# Patient Record
Sex: FEMALE | Race: WHITE | NOT HISPANIC OR LATINO | URBAN - METROPOLITAN AREA
[De-identification: names, ages, dates, MRNs, and addresses within clinical notes are randomized per-mention and may not be internally consistent; named-entity substitution may affect disease eponyms.]

---

## 2018-12-07 ENCOUNTER — IMPORTED ENCOUNTER (OUTPATIENT)
Dept: URBAN - METROPOLITAN AREA CLINIC 50 | Facility: CLINIC | Age: 78
End: 2018-12-07

## 2018-12-10 ENCOUNTER — IMPORTED ENCOUNTER (OUTPATIENT)
Dept: URBAN - METROPOLITAN AREA CLINIC 50 | Facility: CLINIC | Age: 78
End: 2018-12-10

## 2021-04-17 ASSESSMENT — VISUAL ACUITY
OD_CC: J1@ 14 IN
OS_BAT: 20/30-2
OD_OTHER: 20/30-2. 20/40.
OS_CC: 20/30-2
OD_CC: 20/30-2
OS_CC: J1@ 14 IN
OS_OTHER: 20/30-2. 20/30-2.
OD_BAT: 20/30-2

## 2021-04-17 ASSESSMENT — TONOMETRY
OD_IOP_MMHG: 14
OS_IOP_MMHG: 15

## 2021-12-27 ENCOUNTER — COMPREHENSIVE EXAM (OUTPATIENT)
Dept: URBAN - METROPOLITAN AREA CLINIC 52 | Facility: CLINIC | Age: 81
End: 2021-12-27

## 2021-12-27 DIAGNOSIS — E11.3291: ICD-10-CM

## 2021-12-27 DIAGNOSIS — E11.9: ICD-10-CM

## 2021-12-27 DIAGNOSIS — H26.493: ICD-10-CM

## 2021-12-27 DIAGNOSIS — H35.3131: ICD-10-CM

## 2021-12-27 DIAGNOSIS — H04.123: ICD-10-CM

## 2021-12-27 PROCEDURE — 92014 COMPRE OPH EXAM EST PT 1/>: CPT

## 2021-12-27 PROCEDURE — 92134 CPTRZ OPH DX IMG PST SGM RTA: CPT

## 2021-12-27 ASSESSMENT — VISUAL ACUITY
OU_CC: 20/25
OS_PH: 20/30+2
OS_CC: 20/30-1
OD_GLARE: 20/40
OD_GLARE: 20/70
OU_CC: J1
OS_GLARE: 20/80
OS_GLARE: 20/30
OD_CC: 20/40-1

## 2021-12-27 ASSESSMENT — TONOMETRY
OD_IOP_MMHG: 13
OS_IOP_MMHG: 14

## 2021-12-27 NOTE — PATIENT DISCUSSION
Discussed with patient it is recommended patient be referred to [facility] to touch base and verify there is no leakages.